# Patient Record
Sex: FEMALE | URBAN - METROPOLITAN AREA
[De-identification: names, ages, dates, MRNs, and addresses within clinical notes are randomized per-mention and may not be internally consistent; named-entity substitution may affect disease eponyms.]

---

## 2017-04-29 ENCOUNTER — INPATIENT (INPATIENT)
Facility: HOSPITAL | Age: 22
LOS: 2 days | Discharge: HOME | DRG: 885 | End: 2017-05-02
Attending: STUDENT IN AN ORGANIZED HEALTH CARE EDUCATION/TRAINING PROGRAM | Admitting: STUDENT IN AN ORGANIZED HEALTH CARE EDUCATION/TRAINING PROGRAM
Payer: COMMERCIAL

## 2017-04-29 VITALS
TEMPERATURE: 98 F | WEIGHT: 160.06 LBS | DIASTOLIC BLOOD PRESSURE: 87 MMHG | HEIGHT: 65 IN | RESPIRATION RATE: 18 BRPM | SYSTOLIC BLOOD PRESSURE: 124 MMHG | OXYGEN SATURATION: 97 % | HEART RATE: 57 BPM

## 2017-04-29 DIAGNOSIS — F33.2 MAJOR DEPRESSIVE DISORDER, RECURRENT SEVERE WITHOUT PSYCHOTIC FEATURES: ICD-10-CM

## 2017-04-29 LAB
ALBUMIN SERPL ELPH-MCNC: 3.8 G/DL — SIGNIFICANT CHANGE UP (ref 3.4–5)
ALP SERPL-CCNC: 61 U/L — SIGNIFICANT CHANGE UP (ref 40–120)
ALT FLD-CCNC: 25 U/L — SIGNIFICANT CHANGE UP (ref 12–42)
AMPHET UR-MCNC: SIGNIFICANT CHANGE UP NG/ML
ANION GAP SERPL CALC-SCNC: 8 MMOL/L — LOW (ref 9–16)
APAP SERPL-MCNC: <2 UG/ML — LOW (ref 10–30)
AST SERPL-CCNC: 13 U/L — LOW (ref 15–37)
BARBITURATES UR SCN-MCNC: SIGNIFICANT CHANGE UP
BASOPHILS NFR BLD AUTO: 0.6 % — SIGNIFICANT CHANGE UP (ref 0–2)
BENZODIAZ UR-MCNC: NEGATIVE — SIGNIFICANT CHANGE UP
BILIRUB SERPL-MCNC: 0.2 MG/DL — SIGNIFICANT CHANGE UP (ref 0.2–1.2)
BUN SERPL-MCNC: 12 MG/DL — SIGNIFICANT CHANGE UP (ref 7–23)
CALCIUM SERPL-MCNC: 8.8 MG/DL — SIGNIFICANT CHANGE UP (ref 8.5–10.5)
CHLORIDE SERPL-SCNC: 104 MMOL/L — SIGNIFICANT CHANGE UP (ref 96–108)
CO2 SERPL-SCNC: 25 MMOL/L — SIGNIFICANT CHANGE UP (ref 22–31)
COCAINE METAB.OTHER UR-MCNC: SIGNIFICANT CHANGE UP NG/ML
CREAT SERPL-MCNC: 0.55 MG/DL — SIGNIFICANT CHANGE UP (ref 0.5–1.3)
EOSINOPHIL NFR BLD AUTO: 1.5 % — SIGNIFICANT CHANGE UP (ref 0–6)
ETHANOL SERPL-MCNC: <3 MG/DL — SIGNIFICANT CHANGE UP
GLUCOSE SERPL-MCNC: 134 MG/DL — HIGH (ref 70–99)
HCG SERPL-ACNC: <1 MIU/ML — SIGNIFICANT CHANGE UP
HCT VFR BLD CALC: 40.1 % — SIGNIFICANT CHANGE UP (ref 34.5–45)
HGB BLD-MCNC: 14 G/DL — SIGNIFICANT CHANGE UP (ref 11.5–15.5)
LYMPHOCYTES # BLD AUTO: 31.2 % — SIGNIFICANT CHANGE UP (ref 13–44)
MCHC RBC-ENTMCNC: 31 PG — SIGNIFICANT CHANGE UP (ref 27–34)
MCHC RBC-ENTMCNC: 34.9 G/DL — SIGNIFICANT CHANGE UP (ref 32–36)
MCV RBC AUTO: 88.9 FL — SIGNIFICANT CHANGE UP (ref 80–100)
METHADONE UR-MCNC: NEGATIVE — SIGNIFICANT CHANGE UP
MONOCYTES NFR BLD AUTO: 5.1 % — SIGNIFICANT CHANGE UP (ref 2–14)
NEUTROPHILS NFR BLD AUTO: 61.6 % — SIGNIFICANT CHANGE UP (ref 43–77)
OPIATES UR-MCNC: SIGNIFICANT CHANGE UP NG/ML
PCP SPEC-MCNC: SIGNIFICANT CHANGE UP
PCP UR-MCNC: SIGNIFICANT CHANGE UP NG/ML
PLATELET # BLD AUTO: 283 K/UL — SIGNIFICANT CHANGE UP (ref 150–400)
POTASSIUM SERPL-MCNC: 3.9 MMOL/L — SIGNIFICANT CHANGE UP (ref 3.5–5.3)
POTASSIUM SERPL-SCNC: 3.9 MMOL/L — SIGNIFICANT CHANGE UP (ref 3.5–5.3)
PROT SERPL-MCNC: 7.8 G/DL — SIGNIFICANT CHANGE UP (ref 6.4–8.2)
RBC # BLD: 4.51 M/UL — SIGNIFICANT CHANGE UP (ref 3.8–5.2)
RBC # FLD: 12.6 % — SIGNIFICANT CHANGE UP (ref 10.3–16.9)
SALICYLATES SERPL-MCNC: <1.7 MG/DL — LOW (ref 2.8–20)
SODIUM SERPL-SCNC: 137 MMOL/L — SIGNIFICANT CHANGE UP (ref 135–145)
THC UR QL: SIGNIFICANT CHANGE UP NG/ML
WBC # BLD: 9.4 K/UL — SIGNIFICANT CHANGE UP (ref 3.8–10.5)
WBC # FLD AUTO: 9.4 K/UL — SIGNIFICANT CHANGE UP (ref 3.8–10.5)

## 2017-04-29 PROCEDURE — 71010: CPT | Mod: NC

## 2017-04-29 PROCEDURE — 99285 EMERGENCY DEPT VISIT HI MDM: CPT | Mod: 25

## 2017-04-29 PROCEDURE — 71010: CPT | Mod: 26

## 2017-04-29 PROCEDURE — 93010 ELECTROCARDIOGRAM REPORT: CPT | Mod: NC

## 2017-04-29 RX ORDER — DIPHENHYDRAMINE HCL 50 MG
50 CAPSULE ORAL AT BEDTIME
Qty: 0 | Refills: 0 | Status: DISCONTINUED | OUTPATIENT
Start: 2017-04-29 | End: 2017-05-02

## 2017-04-29 RX ORDER — BENZOCAINE AND MENTHOL 5; 1 G/100ML; G/100ML
1 LIQUID ORAL EVERY 4 HOURS
Qty: 0 | Refills: 0 | Status: DISCONTINUED | OUTPATIENT
Start: 2017-04-29 | End: 2017-05-02

## 2017-04-29 RX ORDER — SODIUM CHLORIDE 0.65 %
1 AEROSOL, SPRAY (ML) NASAL
Qty: 0 | Refills: 0 | Status: DISCONTINUED | OUTPATIENT
Start: 2017-04-29 | End: 2017-05-02

## 2017-04-29 RX ORDER — FLUOXETINE HCL 10 MG
20 CAPSULE ORAL DAILY
Qty: 0 | Refills: 0 | Status: DISCONTINUED | OUTPATIENT
Start: 2017-04-29 | End: 2017-05-02

## 2017-04-29 RX ORDER — ACETAMINOPHEN 500 MG
650 TABLET ORAL EVERY 6 HOURS
Qty: 0 | Refills: 0 | Status: DISCONTINUED | OUTPATIENT
Start: 2017-04-29 | End: 2017-05-02

## 2017-04-29 RX ADMIN — Medication 20 MILLIGRAM(S): at 19:09

## 2017-04-29 NOTE — ED ADULT NURSE NOTE - OBJECTIVE STATEMENT
Patient to ED complaining of worsening depression and suicidal ideation x 2 weeks. As per patient, "I don't have a support system in place right now; everyone is far away, either in [facility] or [facility], and I've been having issues with school and socially." Patient reports speaking to counselors at [facility]'s health and wellness office. "It takes two weeks just to see another counselor and I can't wait that long. I thought of overdosing with tylenol, and called the [facility] counselor. They suggested that I come here." Patient denies homicidal ideation. Patient to ED complaining of worsening depression and suicidal ideation x 2 weeks. As per patient, "I don't have a support system in place right now; everyone is far away, either in [facility] or [facility], and I've been having issues with school and socially." Patient reports speaking to counselors at [facility]'s health and wellness office. "It takes two weeks just to see another counselor and I can't wait that long. I thought of overdosing with tylenol, and called the [facility] counselor. They suggested that I come here." Patient denies homicidal ideation. Patient placed on 1:1 constant observation with ED Tech Teresa Glaser.
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
## 2017-04-29 NOTE — ED BEHAVIORAL HEALTH ASSESSMENT NOTE - DESCRIPTION
calm, cooperative hyperlipidemia, does not require medication full time university student at 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

## 2017-04-29 NOTE — ED ADULT TRIAGE NOTE - CHIEF COMPLAINT QUOTE
Pt directed to ED for SI with known hx of overdose attempt.  Pt directed by Angelina of [facility] crisis Support Center.  Pt states "I don't have a plan to hurt myself, but I have before."  Pt denies HI.  Pt denies N/V/D, SOB, Fevers, and any Pain Pt directed to ED for SI with known hx of overdose attempt.  Pt directed by Angelina of [facility] crisis Support [facility].  Pt states "I don't have a plan to hurt myself, but I have before."  Pt denies HI.  Pt denies N/V/D, SOB, Fevers, and any Pain.  [facility] shuttle service: [phone]

## 2017-04-29 NOTE — ED PROVIDER NOTE - MEDICAL DECISION MAKING DETAILS
Pt with s/s noted above.  Concern for SI secondary to major depression.  No ev of acute medical illness, no ev of toxidrome or attempt.  Plan labs, ekg, cxr, psych consult and likely admit.

## 2017-04-29 NOTE — ED PROVIDER NOTE - ENMT, MLM
Airway patent, Nasal mucosa clear. Mouth with normal mucosa. Throat has no vesicles, no oropharyngeal exudates and uvula is midline.  No thyromegaly.
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
## 2017-04-29 NOTE — ED BEHAVIORAL HEALTH ASSESSMENT NOTE - RISK ASSESSMENT
Although pt has protective factors such as good relationship with mom, has future goal to finish school, physically healthy, however, she  is currently considered as danger to self due to having active SI with plan to overdose, feeling hopeless, anhedonia, multiple neurovegetative symptoms, recent break up, prior suicide attempt by overdosing, previous mood episodes, social isolation, adjustment issues since moving to [place] in January this year.  The aforementioned have caused impairment in multiple psychosocial aspects in patient's life.  She meets criteria for inpatient psych admission.

## 2017-04-29 NOTE — ED BEHAVIORAL HEALTH ASSESSMENT NOTE - SUMMARY
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  Though safety is contracted, pt admits to having SI with plan to overdose and does not feel safe on her own outside of the hospital.  She meets criteria for inpatient psych admission for stabilization.  Prozac will be started on admission due to previous good response.  Pt fully understands and agrees with voluntary admission and plan.  She will benefit from outpatient psychiatric treatment and psychotherapy once stabilized.

## 2017-04-29 NOTE — ED BEHAVIORAL HEALTH ASSESSMENT NOTE - OTHER PAST PSYCHIATRIC HISTORY (INCLUDE DETAILS REGARDING ONSET, COURSE OF ILLNESS, INPATIENT/OUTPATIENT TREATMENT)
Pt was first seen by a psychiatrist in 2016 in [place] while study abroad due to depressed mood with SI, was started on prozac with good effect but stopped taking in July 2016 due to feeling better.

## 2017-04-29 NOTE — ED ADULT NURSE NOTE - CHIEF COMPLAINT QUOTE
Pt directed to ED for SI with known hx of overdose attempt.  Pt directed by Angelina of [employer] [facility] crisis [facility].  Pt states "I don't have a plan to hurt myself, but I have before."  Pt denies HI.  Pt denies N/V/D, SOB, Fevers, and any Pain.  [facility] shuttle service: [phone]

## 2017-04-30 PROCEDURE — 99231 SBSQ HOSP IP/OBS SF/LOW 25: CPT

## 2017-04-30 RX ADMIN — Medication 20 MILLIGRAM(S): at 09:47

## 2017-04-30 RX ADMIN — BENZOCAINE AND MENTHOL 1 LOZENGE: 5; 1 LIQUID ORAL at 00:14

## 2017-04-30 RX ADMIN — Medication 650 MILLIGRAM(S): at 00:13

## 2017-04-30 RX ADMIN — Medication 50 MILLIGRAM(S): at 23:23

## 2017-04-30 RX ADMIN — Medication 50 MILLIGRAM(S): at 00:13

## 2017-04-30 RX ADMIN — Medication 1 SPRAY(S): at 00:13

## 2017-04-30 RX ADMIN — BENZOCAINE AND MENTHOL 1 LOZENGE: 5; 1 LIQUID ORAL at 23:24

## 2017-05-01 PROCEDURE — 99223 1ST HOSP IP/OBS HIGH 75: CPT

## 2017-05-01 RX ADMIN — Medication 20 MILLIGRAM(S): at 11:03

## 2017-05-01 RX ADMIN — Medication 50 MILLIGRAM(S): at 21:41

## 2017-05-01 RX ADMIN — BENZOCAINE AND MENTHOL 1 LOZENGE: 5; 1 LIQUID ORAL at 11:03

## 2017-05-01 RX ADMIN — BENZOCAINE AND MENTHOL 1 LOZENGE: 5; 1 LIQUID ORAL at 21:43

## 2017-05-02 VITALS
DIASTOLIC BLOOD PRESSURE: 84 MMHG | WEIGHT: 158.07 LBS | SYSTOLIC BLOOD PRESSURE: 131 MMHG | HEART RATE: 78 BPM | TEMPERATURE: 98 F | RESPIRATION RATE: 20 BRPM

## 2017-05-02 PROCEDURE — 84702 CHORIONIC GONADOTROPIN TEST: CPT

## 2017-05-02 PROCEDURE — 93005 ELECTROCARDIOGRAM TRACING: CPT

## 2017-05-02 PROCEDURE — 85025 COMPLETE CBC W/AUTO DIFF WBC: CPT

## 2017-05-02 PROCEDURE — 71045 X-RAY EXAM CHEST 1 VIEW: CPT

## 2017-05-02 PROCEDURE — 80053 COMPREHEN METABOLIC PANEL: CPT

## 2017-05-02 PROCEDURE — 80307 DRUG TEST PRSMV CHEM ANLYZR: CPT

## 2017-05-02 PROCEDURE — 99238 HOSP IP/OBS DSCHRG MGMT 30/<: CPT

## 2017-05-02 PROCEDURE — 99285 EMERGENCY DEPT VISIT HI MDM: CPT | Mod: 25

## 2017-05-02 RX ORDER — FLUOXETINE HCL 10 MG
1 CAPSULE ORAL
Qty: 14 | Refills: 0 | OUTPATIENT
Start: 2017-05-02 | End: 2017-05-16

## 2017-05-02 RX ORDER — FLUOXETINE HCL 10 MG
1 CAPSULE ORAL
Qty: 0 | Refills: 0 | COMMUNITY
Start: 2017-05-02

## 2017-05-02 RX ADMIN — BENZOCAINE AND MENTHOL 1 LOZENGE: 5; 1 LIQUID ORAL at 10:29

## 2017-05-02 RX ADMIN — Medication 20 MILLIGRAM(S): at 10:29

## 2017-05-08 DIAGNOSIS — R45.851 SUICIDAL IDEATIONS: ICD-10-CM

## 2017-05-08 DIAGNOSIS — F33.2 MAJOR DEPRESSIVE DISORDER, RECURRENT SEVERE WITHOUT PSYCHOTIC FEATURES: ICD-10-CM

## 2017-05-08 DIAGNOSIS — F60.9 PERSONALITY DISORDER, UNSPECIFIED: ICD-10-CM

## 2017-05-08 DIAGNOSIS — E78.00 PURE HYPERCHOLESTEROLEMIA, UNSPECIFIED: ICD-10-CM
